# Patient Record
Sex: MALE | Race: WHITE | NOT HISPANIC OR LATINO | URBAN - NONMETROPOLITAN AREA
[De-identification: names, ages, dates, MRNs, and addresses within clinical notes are randomized per-mention and may not be internally consistent; named-entity substitution may affect disease eponyms.]

---

## 2023-08-01 ENCOUNTER — OFFICE VISIT (OUTPATIENT)
Dept: PRIMARY CARE | Facility: CLINIC | Age: 43
End: 2023-08-01
Payer: COMMERCIAL

## 2023-08-01 VITALS
BODY MASS INDEX: 36.17 KG/M2 | OXYGEN SATURATION: 98 % | HEART RATE: 64 BPM | SYSTOLIC BLOOD PRESSURE: 122 MMHG | DIASTOLIC BLOOD PRESSURE: 80 MMHG | HEIGHT: 76 IN | WEIGHT: 297 LBS

## 2023-08-01 DIAGNOSIS — E78.1 HYPERTRIGLYCERIDEMIA: ICD-10-CM

## 2023-08-01 DIAGNOSIS — I15.9 SECONDARY HYPERTENSION: ICD-10-CM

## 2023-08-01 PROCEDURE — 3074F SYST BP LT 130 MM HG: CPT | Performed by: INTERNAL MEDICINE

## 2023-08-01 PROCEDURE — 3008F BODY MASS INDEX DOCD: CPT | Performed by: INTERNAL MEDICINE

## 2023-08-01 PROCEDURE — 99213 OFFICE O/P EST LOW 20 MIN: CPT | Performed by: INTERNAL MEDICINE

## 2023-08-01 PROCEDURE — 1036F TOBACCO NON-USER: CPT | Performed by: INTERNAL MEDICINE

## 2023-08-01 PROCEDURE — 3079F DIAST BP 80-89 MM HG: CPT | Performed by: INTERNAL MEDICINE

## 2023-08-01 RX ORDER — AMLODIPINE BESYLATE 2.5 MG/1
2.5 TABLET ORAL DAILY
COMMUNITY
Start: 2023-04-12 | End: 2023-08-01 | Stop reason: SDUPTHER

## 2023-08-01 RX ORDER — AMLODIPINE BESYLATE 2.5 MG/1
2.5 TABLET ORAL DAILY
Qty: 90 TABLET | Refills: 3 | Status: SHIPPED | OUTPATIENT
Start: 2023-08-01

## 2023-08-01 ASSESSMENT — ENCOUNTER SYMPTOMS
ABDOMINAL PAIN: 0
COUGH: 0
FATIGUE: 0
WHEEZING: 0
CHEST TIGHTNESS: 0
BLOOD IN STOOL: 0
VOMITING: 0
PALPITATIONS: 0
BACK PAIN: 0
ARTHRALGIAS: 0
DIARRHEA: 0
SHORTNESS OF BREATH: 0
NAUSEA: 0

## 2023-08-01 ASSESSMENT — PATIENT HEALTH QUESTIONNAIRE - PHQ9
SUM OF ALL RESPONSES TO PHQ9 QUESTIONS 1 AND 2: 0
2. FEELING DOWN, DEPRESSED OR HOPELESS: NOT AT ALL
1. LITTLE INTEREST OR PLEASURE IN DOING THINGS: NOT AT ALL

## 2023-08-01 NOTE — PATIENT INSTRUCTIONS
We decided that you can safely return in 1 year and please remember that I would like for you to do lab work a week prior to your follow-up visit.  Please go fasting as we will be checking your cholesterol along with your kidney profile and blood glucose level.  Please keep tabs on your blood pressure and if you see an increase in your levels between now and your next visit please contact us  Please also remember to get the season's flu vaccine

## 2024-01-16 NOTE — PROGRESS NOTES
Patient is here for vasectomy consult. Patient is  and has two children. LUTs are chronic and mild. Denies frequency and urgency. Denies dysuria and hematuria.. Nocturia x1.. Caffeine does worsen LUTs.. No medications for LUTs..

## 2024-01-22 ENCOUNTER — OFFICE VISIT (OUTPATIENT)
Dept: UROLOGY | Facility: CLINIC | Age: 44
End: 2024-01-22
Payer: COMMERCIAL

## 2024-01-22 VITALS — SYSTOLIC BLOOD PRESSURE: 167 MMHG | DIASTOLIC BLOOD PRESSURE: 99 MMHG | HEART RATE: 61 BPM

## 2024-01-22 DIAGNOSIS — Z30.2 ENCOUNTER FOR VASECTOMY: Primary | ICD-10-CM

## 2024-01-22 PROCEDURE — 3077F SYST BP >= 140 MM HG: CPT | Performed by: STUDENT IN AN ORGANIZED HEALTH CARE EDUCATION/TRAINING PROGRAM

## 2024-01-22 PROCEDURE — 3008F BODY MASS INDEX DOCD: CPT | Performed by: STUDENT IN AN ORGANIZED HEALTH CARE EDUCATION/TRAINING PROGRAM

## 2024-01-22 PROCEDURE — 3080F DIAST BP >= 90 MM HG: CPT | Performed by: STUDENT IN AN ORGANIZED HEALTH CARE EDUCATION/TRAINING PROGRAM

## 2024-01-22 PROCEDURE — 1036F TOBACCO NON-USER: CPT | Performed by: STUDENT IN AN ORGANIZED HEALTH CARE EDUCATION/TRAINING PROGRAM

## 2024-01-22 PROCEDURE — 99204 OFFICE O/P NEW MOD 45 MIN: CPT | Performed by: STUDENT IN AN ORGANIZED HEALTH CARE EDUCATION/TRAINING PROGRAM

## 2024-01-22 RX ORDER — DIAZEPAM 5 MG/1
5 TABLET ORAL EVERY 8 HOURS PRN
Qty: 1 TABLET | Refills: 0 | Status: SHIPPED | OUTPATIENT
Start: 2024-01-22 | End: 2024-01-23

## 2024-01-22 NOTE — PROGRESS NOTES
Subjective   Patient ID: Moy Guzman III is a 43 y.o. male    HPI  43 y.o. male patient is here for vasectomy consult. Patient is  and has --children. LUTs are chronic and mild. Denies frequency and urgency. Denies dysuria and hematuria.. Nocturia x1.. Caffeine does worsen LUTs.. No medications for LUTs..           Review of Systems    All systems were reviewed. Anything negative was noted in the HPI.    Objective   Physical Exam    General: Well developed, well nourished, alert and cooperative, appears in no acute distress   Eyes: Non-injected conjunctiva, sclera clear, no proptosis   Cardiac: Extremities are warm and well perfused. No edema, cyanosis or pallor   Lungs: Breathing is easy, non-labored. Speaking in clear and complete sentences. Normal diaphragmatic movement   MSK: Ambulatory with steady gait, unassisted   Neuro: Alert and oriented to person, place, and time   Psych: Demonstrates good judgment and reason, without hallucinations, abnormal affect or abnormal behaviors   Skin: No obvious lesions, no rashes       No CVA tenderness bilaterally   No suprapubic pain or discomfort       Past Medical History:   Diagnosis Date    Hypertension 1/5/21         Past Surgical History:   Procedure Laterality Date    OTHER SURGICAL HISTORY  01/03/2023    No history of surgery       Procedure:    ***    Assessment/Plan     43 y.o. male who presents for ***, {[id number]}     Plan:  - Follow up                  Scribe Attestation  By signing my name below, IKim, Scribe   attest that this documentation has been prepared under the direction and in the presence of Dr. Alexis Morales

## 2024-01-22 NOTE — PROGRESS NOTES
Subjective   Patient ID: Moy Guzman III is a 43 y.o. male    HPI  43 y.o. male who presents for Vasectomy Evaluation, patient has two kids 12 y.o. and 10 y.o.     I had a long and extensive discussion with the patient regarding vasectomy. I informed him that he should consider this procedure as permanent and he should be 100% sure about proceeding with it. I explained to him in detail the steps of the vasectomy, I also had a long discussion with him regarding the possible side effects including infection, hematoma, bleeding, chronic pain, testicular congestion, injury to surrounding structure. We also discussed the multiple studies linking vasectomy to prostate cancer I explained to him the correlation between this procedure and prostate cancer. The patient verbalized understanding of all the risks and benefits and would like to proceed. I explained to him that there is a small chance of spontaneous return of the semen because of reconnection of his vas ending. I also told him that he is not close to the sterile unless it is proven by a post vasectomy semen analysis after around 3 months. I also explained to him that some man with a much longer to clear his semen from there, this most could take up to 6 months during which he would be still able to proceed.      Review of Systems    All systems were reviewed. Anything negative was noted in the HPI.    Objective   Physical Exam    General: Well developed, well nourished, alert and cooperative, appears in no acute distress   Eyes: Non-injected conjunctiva, sclera clear, no proptosis   Cardiac: Extremities are warm and well perfused. No edema, cyanosis or pallor   Lungs: Breathing is easy, non-labored. Speaking in clear and complete sentences. Normal diaphragmatic movement   MSK: Ambulatory with steady gait, unassisted   Neuro: Alert and oriented to person, place, and time   Psych: Demonstrates good judgment and reason, without hallucinations, abnormal affect or  abnormal behaviors   Skin: No obvious lesions, no rashes       No CVA tenderness bilaterally   No suprapubic pain or discomfort       Past Medical History:   Diagnosis Date    Hypertension 1/5/21         Past Surgical History:   Procedure Laterality Date    OTHER SURGICAL HISTORY  01/03/2023    No history of surgery           Assessment/Plan     43 y.o. male who presents today for evaluation of vasectomy. We had an extensive discussion about the procedure and post-procedure protocol. We discussed that vasectomy is intended to be a permanent form of contraception. There are options for fertility post vasectomy, including vasectomy reversal and sperm retrieval but these are not always successful and can be rather expensive.  We highlighted that it is not an immediate form of contraception, and that another form is required until vas occlusion is confirmed with a post-vasectomy semen analysis. We recommend that the patient refrain from ejaculation for 1 week post-procedure and we would be checking a post-vasectomy semen analysis in 2-3 months, or 15-20 ejaculates. Need for repeat vasectomy is very low, <1%. There is a very small risk for pregnancy after vasectomy (1 in 2,000). We define a successful vasectomy by achieving azoospermia or less than 100,000 non-motile sperm on post-vasectomy semen analysis.  We discussed that the procedure would be done in the office under local anesthesia. Complications were discussed including but not limited to pain, which can be chronic in nature, bleeding/hematoma formation, and infection. We recommended ice and rest for the next 48-72hrs. Patient may be prescribed an anti-inflammatory for pain control. Patient is instructed to refrain from strenuous activity for 2 weeks.  No barriers to learning were identified. After all of the patient’s questions were satisfactorily answered, he expressed understanding of the risks of surgery and wishes to proceed with vasectomy.      Plan  Schedule patient for vasectomy                        Scribe Attestation  By signing my name below, I, Christine Dumont   attest that this documentation has been prepared under the direction and in the presence of Dr. Alexis Morales

## 2024-01-31 NOTE — PROGRESS NOTES
Dmission for sterilization  Patient ID: Moy Guzman III is a 43 y.o. male.    Procedures  The patient was prepped and draped in the standard surgical fashion. 1% Lidocaine was injected into the scrotum. A small scrotal excision was made and the vas deferens brought through the incision. We then dissected the vas deferens free of its surroundings attachments and three clips were placed on the vas deferens. A section of the vas deferens was then excised. We then assured that adequate hemostasis was obtained. I closed the excision with a single chromic suture. The identical procedure was performed on the opposite side. The patient tolerated the procedure well and there were no complications. The patient was instructed on post-operative care as well as the importance of dropping off a semen analysis. The post-operative instructions were given to the patient in writing as well.  FOLLOW UP PRN

## 2024-02-04 NOTE — PROGRESS NOTES
Patient ID: Moy Guzman III is a 43 y.o. male.    Vasectomy    Date/Time: 2/5/2024 11:48 AM    Performed by: Alexis Morales MD MPH  Authorized by: Alexis Morales MD MPH      Procedure discussed: discussed risks, benefits, and alternatives    Chaperone present: no    Timeout: timeout called immediately prior to procedure    Prep: patient was prepped and draped in usual sterile fashion    Prep type: Betadine    Anesthesia: local anesthesia used    Local anesthetic: lidocaine without epinephrine    Procedure Details:     Indications: desire for sterilization      Laterality: bilateral      Incisions: 2      Right Vas Deferens:      Divided: yes        Hemoclips applied: yes        Cauterized: yes         Left Vas Deferens:      Divided: yes        Hemoclips applied: yes        Cauterized: yes          Skin closure: suture     Closure suture type: 3-0 chromic gut    Post-Procedure Details:     Pathology sent to lab for analysis: yes      Outcome: patient tolerated procedure well with no complications      Post-procedure interventions: sterile dressing applied and wound care instructions given      Dressing type: antibiotic ointment and scrotal support      [place]  Fu in 12 weeks with post Vas SA

## 2024-02-05 ENCOUNTER — PROCEDURE VISIT (OUTPATIENT)
Dept: UROLOGY | Facility: CLINIC | Age: 44
End: 2024-02-05
Payer: COMMERCIAL

## 2024-02-05 DIAGNOSIS — Z30.2 ADMISSION FOR VASECTOMY: Primary | ICD-10-CM

## 2024-02-05 PROCEDURE — 55250 REMOVAL OF SPERM DUCT(S): CPT | Performed by: STUDENT IN AN ORGANIZED HEALTH CARE EDUCATION/TRAINING PROGRAM

## 2024-06-08 ENCOUNTER — PATIENT MESSAGE (OUTPATIENT)
Dept: UROLOGY | Facility: CLINIC | Age: 44
End: 2024-06-08
Payer: COMMERCIAL

## 2024-06-10 NOTE — TELEPHONE ENCOUNTER
From: Moy Guzman III  To: Alexis Morales  Sent: 6/8/2024 11:01 PM EDT  Subject: Results of Semen Test    I am still waiting to get the results of my Semen being tested at the [facility] in [place], can I please get the results.

## 2024-09-18 ENCOUNTER — LAB (OUTPATIENT)
Facility: LAB | Age: 44
End: 2024-09-18
Payer: COMMERCIAL

## 2024-09-18 ENCOUNTER — APPOINTMENT (OUTPATIENT)
Age: 44
End: 2024-09-18
Payer: COMMERCIAL

## 2024-09-18 VITALS
DIASTOLIC BLOOD PRESSURE: 88 MMHG | HEIGHT: 76 IN | OXYGEN SATURATION: 98 % | SYSTOLIC BLOOD PRESSURE: 138 MMHG | HEART RATE: 62 BPM | BODY MASS INDEX: 36.9 KG/M2 | WEIGHT: 303 LBS

## 2024-09-18 DIAGNOSIS — E78.1 HYPERTRIGLYCERIDEMIA: Primary | ICD-10-CM

## 2024-09-18 DIAGNOSIS — E78.1 HYPERTRIGLYCERIDEMIA: ICD-10-CM

## 2024-09-18 DIAGNOSIS — I15.9 SECONDARY HYPERTENSION: ICD-10-CM

## 2024-09-18 LAB
ANION GAP SERPL CALC-SCNC: 11 MMOL/L (ref 10–20)
BUN SERPL-MCNC: 18 MG/DL (ref 6–23)
CALCIUM SERPL-MCNC: 9.4 MG/DL (ref 8.6–10.3)
CHLORIDE SERPL-SCNC: 104 MMOL/L (ref 98–107)
CHOLEST SERPL-MCNC: 162 MG/DL (ref 0–199)
CHOLESTEROL/HDL RATIO: 3.8
CO2 SERPL-SCNC: 28 MMOL/L (ref 21–32)
CREAT SERPL-MCNC: 1.23 MG/DL (ref 0.5–1.3)
EGFRCR SERPLBLD CKD-EPI 2021: 74 ML/MIN/1.73M*2
GLUCOSE SERPL-MCNC: 94 MG/DL (ref 74–99)
HDLC SERPL-MCNC: 43 MG/DL
LDLC SERPL CALC-MCNC: 82 MG/DL
NON HDL CHOLESTEROL: 119 MG/DL (ref 0–149)
POTASSIUM SERPL-SCNC: 4.5 MMOL/L (ref 3.5–5.3)
SODIUM SERPL-SCNC: 138 MMOL/L (ref 136–145)
TRIGL SERPL-MCNC: 184 MG/DL (ref 0–149)
VLDL: 37 MG/DL (ref 0–40)

## 2024-09-18 PROCEDURE — 80048 BASIC METABOLIC PNL TOTAL CA: CPT

## 2024-09-18 PROCEDURE — 1036F TOBACCO NON-USER: CPT | Performed by: INTERNAL MEDICINE

## 2024-09-18 PROCEDURE — 3008F BODY MASS INDEX DOCD: CPT | Performed by: INTERNAL MEDICINE

## 2024-09-18 PROCEDURE — 90673 RIV3 VACCINE NO PRESERV IM: CPT | Performed by: INTERNAL MEDICINE

## 2024-09-18 PROCEDURE — 90471 IMMUNIZATION ADMIN: CPT | Performed by: INTERNAL MEDICINE

## 2024-09-18 PROCEDURE — 3079F DIAST BP 80-89 MM HG: CPT | Performed by: INTERNAL MEDICINE

## 2024-09-18 PROCEDURE — 99213 OFFICE O/P EST LOW 20 MIN: CPT | Performed by: INTERNAL MEDICINE

## 2024-09-18 PROCEDURE — 36415 COLL VENOUS BLD VENIPUNCTURE: CPT

## 2024-09-18 PROCEDURE — 80061 LIPID PANEL: CPT

## 2024-09-18 PROCEDURE — 3075F SYST BP GE 130 - 139MM HG: CPT | Performed by: INTERNAL MEDICINE

## 2024-09-18 RX ORDER — AMLODIPINE BESYLATE 2.5 MG/1
2.5 TABLET ORAL DAILY
Qty: 90 TABLET | Refills: 3 | Status: SHIPPED | OUTPATIENT
Start: 2024-09-18

## 2024-09-18 ASSESSMENT — ENCOUNTER SYMPTOMS
COUGH: 0
BACK PAIN: 0
PALPITATIONS: 0
FATIGUE: 0
VOMITING: 0
NAUSEA: 0
ABDOMINAL PAIN: 0
WHEEZING: 0
ARTHRALGIAS: 0
CHEST TIGHTNESS: 0
SHORTNESS OF BREATH: 0
DIARRHEA: 0
BLOOD IN STOOL: 0

## 2024-09-18 ASSESSMENT — PROMIS GLOBAL HEALTH SCALE
RATE_AVERAGE_PAIN: 1
RATE_PHYSICAL_HEALTH: VERY GOOD
RATE_GENERAL_HEALTH: VERY GOOD
EMOTIONAL_PROBLEMS: RARELY
RATE_QUALITY_OF_LIFE: VERY GOOD
RATE_MENTAL_HEALTH: VERY GOOD
RATE_SOCIAL_SATISFACTION: VERY GOOD
CARRYOUT_PHYSICAL_ACTIVITIES: COMPLETELY
CARRYOUT_SOCIAL_ACTIVITIES: VERY GOOD

## 2024-09-18 NOTE — PATIENT INSTRUCTIONS
Once you are laboratory test results come back I will notice by you via [place] with the results and recommendations  Please do not state to reach out if you have any issues and we will see you back in 1 year

## 2024-09-18 NOTE — PROGRESS NOTES
Subjective   Patient ID: Moy Guzman III is a 44 y.o. male who presents for Annual Exam.  HPI  He is here today for his annual checkup.  He is looking well and also reports feeling well.  We did conduct a full review of films.  His blood pressure remaines under adequate control.  He has received his flu vaccine for this season.  We discussed checking laboratory because of a previously elevated triglyceride level.  I have agreed to contact him with the results.  We also discussed the new guidelines for colorectal cancer screening.  He does not have a family history of colon cancer but he does express the desire to start screening and he wants to wait until next summer.  We are providing refills of medicines today and if everything goes according to plan we will see him back in 1 year for reevaluation  Review of Systems   Constitutional:  Negative for fatigue.   Respiratory:  Negative for cough, chest tightness, shortness of breath and wheezing.    Cardiovascular:  Negative for chest pain, palpitations and leg swelling.   Gastrointestinal:  Negative for abdominal pain, blood in stool, diarrhea, nausea and vomiting.   Musculoskeletal:  Negative for arthralgias and back pain.     Objective   Physical Exam  Vitals and nursing note reviewed.   Constitutional:       General: He is not in acute distress.     Appearance: Normal appearance.   HENT:      Head: Normocephalic and atraumatic.   Eyes:      Conjunctiva/sclera: Conjunctivae normal.   Cardiovascular:      Rate and Rhythm: Normal rate and regular rhythm.      Heart sounds: Normal heart sounds.   Pulmonary:      Effort: No respiratory distress.      Breath sounds: No wheezing.   Abdominal:      Palpations: Abdomen is soft.      Tenderness: There is no abdominal tenderness. There is no guarding.   Musculoskeletal:         General: No swelling. Normal range of motion.   Skin:     General: Skin is warm and dry.   Neurological:      General: No focal deficit present.       Mental Status: He is alert and oriented to person, place, and time.   Psychiatric:         Behavior: Behavior normal.       Assessment/Plan   Problem List Items Addressed This Visit             ICD-10-CM    Secondary hypertension I15.9     -Currently well-controlled and we will see him annually for checkup         Relevant Medications    amLODIPine (Norvasc) 2.5 mg tablet    Other Relevant Orders    Basic Metabolic Panel    Hypertriglyceridemia - Primary E78.1    Relevant Orders    Lipid Panel          Dori Michael,

## 2025-08-26 ENCOUNTER — APPOINTMENT (OUTPATIENT)
Age: 45
End: 2025-08-26
Payer: COMMERCIAL

## 2025-08-26 VITALS
HEART RATE: 70 BPM | SYSTOLIC BLOOD PRESSURE: 134 MMHG | BODY MASS INDEX: 37.87 KG/M2 | HEIGHT: 76 IN | OXYGEN SATURATION: 96 % | DIASTOLIC BLOOD PRESSURE: 84 MMHG | WEIGHT: 311 LBS

## 2025-08-26 DIAGNOSIS — E78.1 HYPERTRIGLYCERIDEMIA: ICD-10-CM

## 2025-08-26 DIAGNOSIS — I15.9 SECONDARY HYPERTENSION: ICD-10-CM

## 2025-08-26 DIAGNOSIS — Z12.11 ENCOUNTER FOR SCREENING FOR MALIGNANT NEOPLASM OF COLON: Primary | ICD-10-CM

## 2025-08-26 PROCEDURE — 3075F SYST BP GE 130 - 139MM HG: CPT | Performed by: INTERNAL MEDICINE

## 2025-08-26 PROCEDURE — 3008F BODY MASS INDEX DOCD: CPT | Performed by: INTERNAL MEDICINE

## 2025-08-26 PROCEDURE — 1036F TOBACCO NON-USER: CPT | Performed by: INTERNAL MEDICINE

## 2025-08-26 PROCEDURE — 3079F DIAST BP 80-89 MM HG: CPT | Performed by: INTERNAL MEDICINE

## 2025-08-26 PROCEDURE — 99213 OFFICE O/P EST LOW 20 MIN: CPT | Performed by: INTERNAL MEDICINE

## 2025-08-26 RX ORDER — AMLODIPINE BESYLATE 2.5 MG/1
2.5 TABLET ORAL DAILY
Qty: 100 TABLET | Refills: 1 | Status: SHIPPED | OUTPATIENT
Start: 2025-08-26

## 2025-08-26 ASSESSMENT — ENCOUNTER SYMPTOMS
PALPITATIONS: 0
WHEEZING: 0
BLOOD IN STOOL: 0
DIARRHEA: 0
VOMITING: 0
COUGH: 0
BACK PAIN: 0
SHORTNESS OF BREATH: 0
ABDOMINAL PAIN: 0
ARTHRALGIAS: 0
NAUSEA: 0
FATIGUE: 0

## 2025-09-02 DIAGNOSIS — Z12.11 COLON CANCER SCREENING: ICD-10-CM

## 2026-02-18 ENCOUNTER — APPOINTMENT (OUTPATIENT)
Age: 46
End: 2026-02-18
Payer: COMMERCIAL